# Patient Record
Sex: FEMALE | Race: WHITE | NOT HISPANIC OR LATINO | Employment: FULL TIME | URBAN - METROPOLITAN AREA
[De-identification: names, ages, dates, MRNs, and addresses within clinical notes are randomized per-mention and may not be internally consistent; named-entity substitution may affect disease eponyms.]

---

## 2021-10-13 ENCOUNTER — OFFICE VISIT (OUTPATIENT)
Dept: INTERNAL MEDICINE | Facility: CLINIC | Age: 56
End: 2021-10-13

## 2021-10-13 VITALS
WEIGHT: 228 LBS | SYSTOLIC BLOOD PRESSURE: 156 MMHG | TEMPERATURE: 97.8 F | BODY MASS INDEX: 36.64 KG/M2 | HEART RATE: 76 BPM | HEIGHT: 66 IN | DIASTOLIC BLOOD PRESSURE: 92 MMHG

## 2021-10-13 DIAGNOSIS — I10 BENIGN ESSENTIAL HYPERTENSION: Primary | ICD-10-CM

## 2021-10-13 DIAGNOSIS — E11.9 INSULIN DEPENDENT TYPE 2 DIABETES MELLITUS (HCC): ICD-10-CM

## 2021-10-13 DIAGNOSIS — H26.9 CATARACT OF BOTH EYES, UNSPECIFIED CATARACT TYPE: ICD-10-CM

## 2021-10-13 DIAGNOSIS — Z01.818 PRE-OP EVALUATION: ICD-10-CM

## 2021-10-13 DIAGNOSIS — M60.9 MYOSITIS, UNSPECIFIED MYOSITIS TYPE, UNSPECIFIED SITE: ICD-10-CM

## 2021-10-13 DIAGNOSIS — G89.29 CHRONIC PAIN OF BOTH SHOULDERS: ICD-10-CM

## 2021-10-13 DIAGNOSIS — F51.01 PRIMARY INSOMNIA: ICD-10-CM

## 2021-10-13 DIAGNOSIS — Z79.899 LONG-TERM USE OF HIGH-RISK MEDICATION: ICD-10-CM

## 2021-10-13 DIAGNOSIS — Z79.4 INSULIN DEPENDENT TYPE 2 DIABETES MELLITUS (HCC): ICD-10-CM

## 2021-10-13 DIAGNOSIS — F41.9 ANXIETY AND DEPRESSION: ICD-10-CM

## 2021-10-13 DIAGNOSIS — F32.A ANXIETY AND DEPRESSION: ICD-10-CM

## 2021-10-13 DIAGNOSIS — M25.512 CHRONIC PAIN OF BOTH SHOULDERS: ICD-10-CM

## 2021-10-13 DIAGNOSIS — Z85.820 HISTORY OF MELANOMA: ICD-10-CM

## 2021-10-13 DIAGNOSIS — M25.511 CHRONIC PAIN OF BOTH SHOULDERS: ICD-10-CM

## 2021-10-13 PROCEDURE — 99205 OFFICE O/P NEW HI 60 MIN: CPT | Performed by: NURSE PRACTITIONER

## 2021-10-13 RX ORDER — TOPIRAMATE 50 MG/1
50 TABLET, FILM COATED ORAL 2 TIMES DAILY
COMMUNITY

## 2021-10-13 RX ORDER — MULTIPLE VITAMINS W/ MINERALS TAB 9MG-400MCG
1 TAB ORAL DAILY
COMMUNITY
End: 2022-02-22

## 2021-10-13 RX ORDER — LISINOPRIL 40 MG/1
40 TABLET ORAL DAILY
COMMUNITY

## 2021-10-13 RX ORDER — OMEPRAZOLE 40 MG/1
40 CAPSULE, DELAYED RELEASE ORAL DAILY PRN
COMMUNITY

## 2021-10-13 RX ORDER — LORATADINE 10 MG/1
10 TABLET ORAL DAILY
COMMUNITY

## 2021-10-13 RX ORDER — ZOLPIDEM TARTRATE 10 MG/1
10 TABLET ORAL NIGHTLY PRN
COMMUNITY
End: 2021-10-20 | Stop reason: SDUPTHER

## 2021-10-13 RX ORDER — FUROSEMIDE 40 MG/1
40 TABLET ORAL DAILY
COMMUNITY

## 2021-10-13 RX ORDER — AMLODIPINE BESYLATE 5 MG/1
5 TABLET ORAL DAILY
Qty: 30 TABLET | Refills: 2 | Status: SHIPPED | OUTPATIENT
Start: 2021-10-13 | End: 2022-01-04

## 2021-10-13 RX ORDER — ESCITALOPRAM OXALATE 10 MG/1
10 TABLET ORAL DAILY
COMMUNITY
End: 2021-10-13 | Stop reason: SDUPTHER

## 2021-10-13 RX ORDER — ESCITALOPRAM OXALATE 10 MG/1
20 TABLET ORAL DAILY
Qty: 30 TABLET | Refills: 2 | Status: SHIPPED | OUTPATIENT
Start: 2021-10-13 | End: 2021-10-25 | Stop reason: SDUPTHER

## 2021-10-13 RX ORDER — ALPRAZOLAM 0.5 MG/1
0.5 TABLET ORAL 2 TIMES DAILY PRN
COMMUNITY
End: 2021-10-20 | Stop reason: SDUPTHER

## 2021-10-13 RX ORDER — TIZANIDINE 4 MG/1
4 TABLET ORAL EVERY 8 HOURS PRN
COMMUNITY

## 2021-10-13 RX ORDER — HYDROCODONE BITARTRATE AND ACETAMINOPHEN 7.5; 325 MG/1; MG/1
1 TABLET ORAL EVERY 8 HOURS PRN
COMMUNITY
End: 2021-10-20 | Stop reason: SDUPTHER

## 2021-10-13 NOTE — PROGRESS NOTES
Shanique Luke  1965  [id number]  Patient Care Team:  Tonya Taylor APRN as PCP - General (Internal Medicine)    Shanique Luke is a pleasant 56 y.o. female who presents for evaluation of Pre-op Exam (Patient is here to establish care )    Chief Complaint   Patient presents with   • Pre-op Exam     Patient is here to establish care        HPI:   Riana is scheduled for cataract surgery.  She needs pre-op evaluation. Left eye cataract surgery scheduled 10/27/21 Dr. Garcia  No cardiac history no lung history.  No STUART.  Multiple past surgeries, no past problem with anesthesia.  Quit smoking 12 yrs ago.    Most recent labs Aug 2021 with PCP [facility]    Riana is here to establish care.  Her primary care provider at [facility] was Kellie Tierney.  She had been caring for her for years but an insurance change has necessitated a provider change.  She has a significant medical history including debilitating inclusion body myositis (formal dx ), hypertension, diabetes (insulin pump), melanoma, multinodular thyroid, primary insomnia, depression and anxiety.  She has had a traumatic adult life.  Her first  committed suicide.  They were together from the time she was [age].  Her second  left her because he thought she was lazy.  After their divorce when she was finally diagnosed with myositis by [facility] he turned to drugs and  of a heroin overdose.  She has no children.  Her parents live in [place], her mom has dementia.  She has a sister that lives here.  States he worked as a  for 20 years, then as an LPN.  She quit working in April of this year due to worse disability and frequent falls.  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    Driving is difficult most days, she makes short trips to grocery, but getting walker in and out of the car is hard. Does have a motorized scooter, [facility] [place] [facility]. Chronic right sided foot drop.    Bilat frozen shoulders, left  worse than right, pain worse after most recent fall and she could no longer work.  Uses hydrocodone BID, uses Advil 200mg every am and prn during the night    bp is high here and has seen it high at home recently.  Compliant with lisinopril and furosemide    Inclusion body myositis formal dx 2014 at [facility] after 10 years of leg pain, loss of muscle in extremities and frequent falls.  Does PT at home to maintain muscle.     Anxiety and Depression:  On Topamax, alprazolam BID, and escitalopram.  Ambien for insomnia.    Past Medical History:   Diagnosis Date   • Anxiety    • Arthritis    • Depression    • Diabetes mellitus (HCC)    • Inclusion body myositis    • Insomnia    • Melanoma (HCC)    • Multinodular thyroid    • Small bowel obstruction (HCC)      Past Surgical History:   Procedure Laterality Date   • CATARACT EXTRACTION Right 2019   • CERVICAL FUSION     • GASTROPLASTY     • HYSTERECTOMY     • LAMINECTOMY  2016   • MYOMECTOMY     • PELVIC LAPAROSCOPY       Family History   Problem Relation Age of Onset   • Depression Mother    • Diabetes Mother    • Depression Father    • Hypertension Father    • Rheum arthritis Maternal Grandmother    • Osteoporosis Maternal Grandmother    • Stroke Maternal Grandmother    • Arthritis Maternal Grandmother         RA   • Stroke Maternal Grandfather    • Pancreatic cancer Paternal Grandfather    • Cancer Paternal Grandfather         pancreatic     Social History     Tobacco Use   Smoking Status Former Smoker   • Packs/day: 1.50   • Years: 10.00   • Pack years: 15.00   • Quit date: 10/13/2009   • Years since quittin.0   Smokeless Tobacco Never Used     Allergies   Allergen Reactions   • Morphine Shortness Of Breath     Respiratory distress     • Penicillins Rash       Current Outpatient Medications:   •  Ascorbic Acid (VITAMIN C PO), Take 1 tablet by mouth Daily., Disp: , Rfl:   •  Cholecalciferol (VITAMIN D3 PO), Take 1 capsule by mouth  "Daily., Disp: , Rfl:   •  furosemide (LASIX) 40 MG tablet, Take 40 mg by mouth Daily., Disp: , Rfl:   •  insulin lispro (HumaLOG) 100 UNIT/ML patient supplied pump, Inject 100 Units under the skin into the appropriate area as directed Continuous. Uses 3 vials per month, Disp: , Rfl:   •  lisinopril (PRINIVIL,ZESTRIL) 40 MG tablet, Take 40 mg by mouth Daily., Disp: , Rfl:   •  loratadine (CLARITIN) 10 MG tablet, Take 10 mg by mouth Daily., Disp: , Rfl:   •  Multiple Vitamins-Minerals (ZINC PO), Take 1 tablet by mouth Daily., Disp: , Rfl:   •  multivitamin with minerals (HAIR/SKIN/NAILS PO), Take 1 tablet by mouth Daily., Disp: , Rfl:   •  omeprazole (priLOSEC) 40 MG capsule, Take 40 mg by mouth Daily., Disp: , Rfl:   •  tiZANidine (ZANAFLEX) 4 MG tablet, Take 4 mg by mouth Every 8 (Eight) Hours As Needed for Muscle Spasms., Disp: , Rfl:   •  topiramate (TOPAMAX) 50 MG tablet, Take 50 mg by mouth 2 (Two) Times a Day., Disp: , Rfl:   •  ALPRAZolam (XANAX) 0.5 MG tablet, Take 1 tablet by mouth 2 (Two) Times a Day As Needed for Anxiety., Disp: 60 tablet, Rfl: 2  •  amLODIPine (NORVASC) 5 MG tablet, Take 1 tablet by mouth Daily., Disp: 30 tablet, Rfl: 2  •  escitalopram (LEXAPRO) 10 MG tablet, Take 2 tablets by mouth Daily., Disp: 180 tablet, Rfl: 1  •  HYDROcodone-acetaminophen (NORCO) 7.5-325 MG per tablet, Take 1 tablet by mouth Every 8 (Eight) Hours As Needed for Moderate Pain ., Disp: 90 tablet, Rfl: 0  •  zolpidem (AMBIEN) 10 MG tablet, Take 1 tablet by mouth At Night As Needed for Sleep., Disp: 30 tablet, Rfl: 2    Review of Systems  /92 (BP Location: Left arm, Patient Position: Sitting, Cuff Size: Adult)   Pulse 76   Temp 97.8 °F (36.6 °C) (Temporal)   Ht 167 cm (65.75\")   Wt 103 kg (228 lb)   BMI 37.08 kg/m²     Physical Exam  Constitutional:       Appearance: She is well-developed.   HENT:      Head: Normocephalic and atraumatic.      Comments: *wearing mask  Eyes:      Conjunctiva/sclera: " Conjunctivae normal.      Pupils: Pupils are equal, round, and reactive to light.   Cardiovascular:      Rate and Rhythm: Normal rate and regular rhythm.      Pulses: Normal pulses.      Heart sounds: Normal heart sounds.   Pulmonary:      Effort: Pulmonary effort is normal.      Breath sounds: Normal breath sounds.   Musculoskeletal:         General: Normal range of motion.      Cervical back: Normal range of motion and neck supple.      Right lower leg: No edema.      Left lower leg: No edema.   Skin:     General: Skin is warm and dry.   Neurological:      Mental Status: She is alert and oriented to person, place, and time.   Psychiatric:         Mood and Affect: Mood normal.         Behavior: Behavior normal.         Thought Content: Thought content normal.         Judgment: Judgment normal.         Procedures    Results Review:  None    PHQ-9 Total Score:      Assessment/Plan:  Diagnoses and all orders for this visit:    1. Benign essential hypertension (Primary)  Comments:  continue lisinopril and furosemide, adding amlodipne 5mg    2. Pre-op evaluation  Comments:  cleared for cataract surgery    3. Myositis, unspecified myositis type, unspecified site  Comments:  continue home strength training, activity as tolerated, will refer to Dr. Iverson, will refill hydrocodone this mo  Orders:  -     Ambulatory Referral to Pain Management    4. Anxiety and depression  Comments:  will refer to behavioral health, will refill alprazolam this mo, do not recommend for long term use  Orders:  -     Ambulatory Referral to Behavioral Health    5. Primary insomnia  -     Ambulatory Referral to Behavioral Health    6. Insulin dependent type 2 diabetes mellitus (HCC)  Comments:  insulin pump    7. History of melanoma    8. Long-term use of high-risk medication  -     Cancel: Compliance Drug Analysis, Ur - Urine, Clean Catch; Future    9. Cataract of both eyes, unspecified cataract type    10. Chronic pain of both shoulders  -      Ambulatory Referral to Pain Management    Other orders  -     amLODIPine (NORVASC) 5 MG tablet; Take 1 tablet by mouth Daily.  Dispense: 30 tablet; Refill: 2  -     Discontinue: escitalopram (LEXAPRO) 10 MG tablet; Take 2 tablets by mouth Daily.  Dispense: 30 tablet; Refill: 2       Patient Instructions   Will request records from Kellie Tierney at [facility] including most recent lab results.  Clearance for cataract surgery pending.    Plan of care reviewed with patient at the conclusion of today's visit. Education was provided regarding diagnosis, management and any prescribed or recommended OTC medications.  Patient verbalizes understanding of and agreement with management plan.    Return in about 1 month (around 11/13/2021).    *Note that portions of this note were completed with a voice recognition program.  Efforts were made to edit the dictation but occasionally words are transcribed.    TREVOR Prince    I spent 60 minutes in patient care: Reviewing records prior to the visit, examining the patient, entering orders and documentation.

## 2021-10-16 PROBLEM — Z85.820 HISTORY OF MELANOMA: Status: ACTIVE | Noted: 2021-10-16

## 2021-10-16 PROBLEM — F32.A ANXIETY AND DEPRESSION: Status: ACTIVE | Noted: 2021-10-16

## 2021-10-16 PROBLEM — F51.01 PRIMARY INSOMNIA: Status: ACTIVE | Noted: 2021-10-16

## 2021-10-16 PROBLEM — I10 BENIGN ESSENTIAL HYPERTENSION: Status: ACTIVE | Noted: 2021-10-16

## 2021-10-16 PROBLEM — M60.9 MYOSITIS: Status: ACTIVE | Noted: 2021-10-16

## 2021-10-16 PROBLEM — F41.9 ANXIETY AND DEPRESSION: Status: ACTIVE | Noted: 2021-10-16

## 2021-10-16 PROBLEM — G72.41 INCLUSION BODY MYOSITIS (IBM): Status: ACTIVE | Noted: 2021-10-16

## 2021-10-16 NOTE — PATIENT INSTRUCTIONS
Will request records from Kellie Tierney at [facility] including most recent lab results.  Clearance for cataract surgery pending.

## 2021-10-19 LAB — DRUGS UR: NORMAL

## 2021-10-20 ENCOUNTER — PATIENT ROUNDING (BHMG ONLY) (OUTPATIENT)
Dept: INTERNAL MEDICINE | Facility: CLINIC | Age: 56
End: 2021-10-20

## 2021-10-20 DIAGNOSIS — F32.A ANXIETY AND DEPRESSION: Primary | ICD-10-CM

## 2021-10-20 DIAGNOSIS — G72.41 INCLUSION BODY MYOSITIS: ICD-10-CM

## 2021-10-20 DIAGNOSIS — F41.9 ANXIETY AND DEPRESSION: Primary | ICD-10-CM

## 2021-10-20 DIAGNOSIS — F51.01 PRIMARY INSOMNIA: ICD-10-CM

## 2021-10-20 RX ORDER — HYDROCODONE BITARTRATE AND ACETAMINOPHEN 7.5; 325 MG/1; MG/1
1 TABLET ORAL EVERY 8 HOURS PRN
Qty: 90 TABLET | Refills: 0 | Status: SHIPPED | OUTPATIENT
Start: 2021-10-20 | End: 2021-12-01 | Stop reason: SDUPTHER

## 2021-10-20 RX ORDER — ZOLPIDEM TARTRATE 10 MG/1
10 TABLET ORAL NIGHTLY PRN
Qty: 30 TABLET | Refills: 2 | Status: SHIPPED | OUTPATIENT
Start: 2021-10-20 | End: 2022-02-22 | Stop reason: SDUPTHER

## 2021-10-20 RX ORDER — ALPRAZOLAM 0.5 MG/1
0.5 TABLET ORAL 2 TIMES DAILY PRN
Qty: 60 TABLET | Refills: 2 | Status: SHIPPED | OUTPATIENT
Start: 2021-10-20

## 2021-10-20 NOTE — PROGRESS NOTES
Spoke to this patient for over 20 mins,  She stated that Tonya was so nice & well intentioned, however she (Tonya) doesn't understand her neuromuscular disease & that her meds have been worked out with her providers over the years.  Was diagnosed at [facility] 7 yrs ago & the disease is rare.  Hydrocodine is least amount of med she can take without damage to her kidneys & in light of her diabetes.  States her Pascual is clean & she only takes when she has pain.  Has 15 to 20 pills now but will need a refill before her next appt here.  She told Tonya she needed a refill at her visit, however it was not done. Pt is now feeling that we think she is an addict when she is not & she is afraid to ask for the refill again.    Also concerned over her xanax as Tonya would like her to go to counseling & go off xanax.  States the xanax works for her as she has a lot of violent interactions in her past job & personal life as well.  Her panic attacks are now under control & it makes her nervous to think of coming off the med.    Her hope is that [facility] will again take her ins the first of the year so she can return to her regular physician.    I asked her if I could send Tonya a message to refill the Hydrocodone for her as it may have just been an oversight.  She agreed to this.    Told her to take care & we would see her back Nov 16th.

## 2021-10-20 NOTE — TELEPHONE ENCOUNTER
Called pt to welcome her as a new patient to our practice & ask if her visit went well.    Long discussion about her disease/condition as well as that she asked for a refill of her hydrocodone at that visit but it was not refilled nor was she informed of why.    I stated that it is possible this was an oversight on our part & that I would put in a request for her.    If not refillable, please inform the pt of the reason so she can be educated on this.    Thanks

## 2021-10-20 NOTE — TELEPHONE ENCOUNTER
You can let her know I refilled all 3 of her controlled medicines, I was able to check her Pascual I had just been waiting on her drug screen to result.

## 2021-10-21 ENCOUNTER — TELEPHONE (OUTPATIENT)
Dept: INTERNAL MEDICINE | Facility: CLINIC | Age: 56
End: 2021-10-21

## 2021-10-21 RX ORDER — ESCITALOPRAM OXALATE 10 MG/1
20 TABLET ORAL DAILY
Qty: 180 TABLET | Refills: 1 | Status: CANCELLED | OUTPATIENT
Start: 2021-10-21

## 2021-10-21 NOTE — TELEPHONE ENCOUNTER
Caller: [id number] [employer] - [place], [place] - [address] - [phone] [facility] [phone] FX    Relationship: Pharmacy    Best call back number: [phone]    What test/procedure requested: NORCO 7.5    When is it needed: [id number]    Additional information or concerns:  THIS RX NEEDS A PRIOR AUTH

## 2021-10-21 NOTE — TELEPHONE ENCOUNTER
Called talked to Kaveh informed him we do not do PA for anything under $40.00 and they can get it with a [email or web address] card for $$21.44  He said he will let patient know

## 2021-10-21 NOTE — TELEPHONE ENCOUNTER
Spoke with pt and she said she never requested a 90 day supply of a scheduled narcotic. She states that she has never heard of such a thing as an office not doing a PA when a PA would help her get the medicine for free. She states that she is completely disappointed in her experience with out office. 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

## 2021-10-25 RX ORDER — ESCITALOPRAM OXALATE 10 MG/1
20 TABLET ORAL DAILY
Qty: 180 TABLET | Refills: 1 | Status: SHIPPED | OUTPATIENT
Start: 2021-10-25 | End: 2022-02-22 | Stop reason: SDUPTHER

## 2021-10-26 ENCOUNTER — TELEPHONE (OUTPATIENT)
Dept: INTERNAL MEDICINE | Facility: CLINIC | Age: 56
End: 2021-10-26

## 2021-10-26 ENCOUNTER — DOCUMENTATION (OUTPATIENT)
Dept: INTERNAL MEDICINE | Facility: CLINIC | Age: 56
End: 2021-10-26

## 2021-10-26 NOTE — TELEPHONE ENCOUNTER
Unfortunately, I cannot clear her for surgery without reviewing her recent labs from her prior PCP.  She has a significant amount of medical history including diabetes.  We have requested those labs from [facility] but I never got them.  If we can get them today I may be able to clear her

## 2021-10-26 NOTE — TELEPHONE ENCOUNTER
ALAN FROM DR. ANGELICA BENITEZ'S OFFICE IS CALLING TO SEE IF PATIENT HAS BEEN CLEARED FOR CATARACT SURGERY SCHEDULED FOR IN THE MORNING AT 8AM.    THE OFFICE NOTE FROM 10/13/21 SAYS CLEARANCE DEPENDENT ON LAB RESULTS FOR DRUG ANALYSIS.    NEED TO KNOW ANSWERS ASAP BECAUSE PATIENT IS SECOND IN LINE IN THE MORNING FOR CATARACT SURGERY.    [phone]  OFFICE [phone]    ADDENDUM:  [facility] WAS WAITING ON LABS THAT PATIENT HAD DONE BY ANOTHER PROVIDER IN ORDER TO CLEAR HER FOR SURGERY.  LABS HAD NOT BEEN RECEIVED AS OF YET.

## 2021-10-26 NOTE — TELEPHONE ENCOUNTER
Called [facility] and they are faxing over notes. Called Dr. Schmidt's office to give an update and Dr. Schmidt asked to speak with Tonya and the phone call was transferred to Tonya.

## 2021-10-26 NOTE — PROGRESS NOTES
Spoke with Dr Javier this afternoon, he was able to discuss most recent CBC, CMP and A1C from August.  A1C 9.2, BUN 24.  Will clear for surgery and fax letter back to his office for cataract surgery tomorrow.

## 2021-11-16 ENCOUNTER — OFFICE VISIT (OUTPATIENT)
Dept: INTERNAL MEDICINE | Facility: CLINIC | Age: 56
End: 2021-11-16

## 2021-11-16 ENCOUNTER — LAB (OUTPATIENT)
Dept: LAB | Facility: HOSPITAL | Age: 56
End: 2021-11-16

## 2021-11-16 VITALS
DIASTOLIC BLOOD PRESSURE: 84 MMHG | SYSTOLIC BLOOD PRESSURE: 146 MMHG | TEMPERATURE: 97.1 F | HEIGHT: 66 IN | HEART RATE: 84 BPM | WEIGHT: 228 LBS | BODY MASS INDEX: 36.64 KG/M2

## 2021-11-16 DIAGNOSIS — E78.2 MIXED HYPERLIPIDEMIA: ICD-10-CM

## 2021-11-16 DIAGNOSIS — M25.50 ARTHRALGIA, UNSPECIFIED JOINT: ICD-10-CM

## 2021-11-16 DIAGNOSIS — F41.9 ANXIETY AND DEPRESSION: ICD-10-CM

## 2021-11-16 DIAGNOSIS — E11.9 INSULIN DEPENDENT TYPE 2 DIABETES MELLITUS (HCC): Primary | ICD-10-CM

## 2021-11-16 DIAGNOSIS — E11.9 INSULIN DEPENDENT TYPE 2 DIABETES MELLITUS (HCC): ICD-10-CM

## 2021-11-16 DIAGNOSIS — D64.9 ANEMIA, UNSPECIFIED TYPE: ICD-10-CM

## 2021-11-16 DIAGNOSIS — E55.9 VITAMIN D DEFICIENCY: ICD-10-CM

## 2021-11-16 DIAGNOSIS — R59.9 SWELLING OF LYMPH NODE: ICD-10-CM

## 2021-11-16 DIAGNOSIS — Z79.4 INSULIN DEPENDENT TYPE 2 DIABETES MELLITUS (HCC): Primary | ICD-10-CM

## 2021-11-16 DIAGNOSIS — G72.41 INCLUSION BODY MYOSITIS (IBM): ICD-10-CM

## 2021-11-16 DIAGNOSIS — Z79.4 INSULIN DEPENDENT TYPE 2 DIABETES MELLITUS (HCC): ICD-10-CM

## 2021-11-16 DIAGNOSIS — F32.A ANXIETY AND DEPRESSION: ICD-10-CM

## 2021-11-16 DIAGNOSIS — I10 BENIGN ESSENTIAL HYPERTENSION: ICD-10-CM

## 2021-11-16 DIAGNOSIS — L40.9 SCALP PSORIASIS: ICD-10-CM

## 2021-11-16 PROBLEM — Z98.84 H/O GASTRIC BYPASS: Status: ACTIVE | Noted: 2021-11-16

## 2021-11-16 LAB
EXPIRATION DATE: ABNORMAL
HBA1C MFR BLD: 7.9 % (ref 4.8–5.6)
Lab: ABNORMAL

## 2021-11-16 PROCEDURE — 83036 HEMOGLOBIN GLYCOSYLATED A1C: CPT | Performed by: NURSE PRACTITIONER

## 2021-11-16 PROCEDURE — 99215 OFFICE O/P EST HI 40 MIN: CPT | Performed by: NURSE PRACTITIONER

## 2021-11-16 PROCEDURE — 90471 IMMUNIZATION ADMIN: CPT | Performed by: NURSE PRACTITIONER

## 2021-11-16 PROCEDURE — 90686 IIV4 VACC NO PRSV 0.5 ML IM: CPT | Performed by: NURSE PRACTITIONER

## 2021-11-16 PROCEDURE — 3051F HG A1C>EQUAL 7.0%<8.0%: CPT | Performed by: NURSE PRACTITIONER

## 2021-11-16 RX ORDER — PREDNISOLONE ACETATE 10 MG/ML
1 SUSPENSION/ DROPS OPHTHALMIC 2 TIMES DAILY
COMMUNITY
Start: 2021-10-12 | End: 2022-02-22

## 2021-11-16 RX ORDER — KETOROLAC TROMETHAMINE 5 MG/ML
1 SOLUTION OPHTHALMIC 2 TIMES DAILY
COMMUNITY
Start: 2021-10-12 | End: 2022-02-22

## 2021-11-16 NOTE — PROGRESS NOTES
Shanique Luke  1965  [id number]  Patient Care Team:  Tonya Taylor APRN as PCP - General (Internal Medicine)  Henrry Simms MD (Rheumatology)  Harshad Stephens (Endocrinology)  Lester Garcia MD as Consulting Physician (Ophthalmology)  Dell Ritter MD (Gastroenterology)    Shanique Luke is a pleasant 56 y.o. female who presents for evaluation of Hypertension (1 month follow up )    Chief Complaint   Patient presents with   • Hypertension     1 month follow up         HPI:   Riana is here for follow up.  This is our second visit.  Her primary care provider at [facility] was Dr. Kellie Tierney. She had been caring for her for years but an insurance change has necessitated a provider change.  She has a significant medical history including debilitating inclusion body myositis (formal dx 2014), hypertension, diabetes (insulin pump), melanoma, multinodular thyroid, primary insomnia, depression and anxiety.     I was able to review last labs from Aug which included CBC, CMP, A1C.  CBC showed mild anemia.  She is fasting for labs today.    DM:  Dx at age 18.  Has had an insulin pump about 8 yrs.   avg fasting am bs 119, sometimes as low as 80.  Rarely lower than 80. Rarely above 250.  Last A1C 9.2, today 7.9.  Highest was 13 which was about a year ago.  She verbalized that she is not motivated to care much about it. Sees Minnie for endo, he has treated thyroid primarily in the past.  Sees Dr. Javier for eye exams.  Recent cataract surgery went well.  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  She knows what to do but struggles with the diabetic diet. She is not on a statin and has declined in the past.     Unsure if she has seen cards in the past    HTN:  Better after adding amlodipine 5 mg last visit.  No chest pain, Left Ankle swelling is chronic (secondary to remote injury), otherwise no swelling.  Home bp avg 120s/70s    She had vertical banded gastroplasty about 5 yrs ago.   Before the myositis she weighed about 180 and was active daily.    She has noticed swollen cervical lymph nodes intermittantly over the past year. sometimes supraclaicular and posterior neck.  Had thyroid u/s and biopsy last year    She has had scalp lesions for many years that have never been treated.  They develop as pustules and itch then often change into plaques or shiny lesions with loss of hair. Generalized hair thinning.    No past history of autoimmune d/o.  Had neg AUDELIA years ago.  Past tx with methotrexate, prednisone and IgG infusions during work up for myositis.  Originally thought she had dermatomyositis because of skin lesion on right 3rd finger but muscle biopsy ruled that out and inclusion body myositis was dx by [facility]. She has treated with topical steroid in the past.  Sx of muscle weakness and pain developed drastically from May to Oct 2006.    Past Medical History:   Diagnosis Date   • Anxiety    • Arthritis    • Depression    • Diabetes mellitus (HCC)    • Inclusion body myositis    • Insomnia    • Melanoma (HCC) 1995   • Multinodular thyroid    • Small bowel obstruction (HCC)      Past Surgical History:   Procedure Laterality Date   • CATARACT EXTRACTION Right 2019   • CERVICAL FUSION  2018   • GASTROPLASTY  2001   • HYSTERECTOMY  2013   • LAMINECTOMY  2016   • MYOMECTOMY  1994   • PELVIC LAPAROSCOPY  1995     Family History   Problem Relation Age of Onset   • Depression Mother    • Diabetes Mother    • Depression Father    • Hypertension Father    • Rheum arthritis Maternal Grandmother    • Osteoporosis Maternal Grandmother    • Stroke Maternal Grandmother    • Arthritis Maternal Grandmother         RA   • Stroke Maternal Grandfather    • Pancreatic cancer Paternal Grandfather    • Cancer Paternal Grandfather         pancreatic     Social History     Tobacco Use   Smoking Status Former Smoker   • Packs/day: 1.50   • Years: 10.00   • Pack years: 15.00   • Quit date: 10/13/2009   • Years  since quittin.1   Smokeless Tobacco Never Used     Allergies   Allergen Reactions   • Morphine Shortness Of Breath     Respiratory distress     • Doxycycline Hyclate Rash   • Penicillins Rash       Current Outpatient Medications:   •  ALPRAZolam (XANAX) 0.5 MG tablet, Take 1 tablet by mouth 2 (Two) Times a Day As Needed for Anxiety., Disp: 60 tablet, Rfl: 2  •  amLODIPine (NORVASC) 5 MG tablet, Take 1 tablet by mouth Daily., Disp: 30 tablet, Rfl: 2  •  Ascorbic Acid (VITAMIN C PO), Take 1 tablet by mouth Daily., Disp: , Rfl:   •  Cholecalciferol (VITAMIN D3 PO), Take 1 capsule by mouth Daily., Disp: , Rfl:   •  escitalopram (LEXAPRO) 10 MG tablet, Take 2 tablets by mouth Daily., Disp: 180 tablet, Rfl: 1  •  furosemide (LASIX) 40 MG tablet, Take 40 mg by mouth Daily., Disp: , Rfl:   •  HYDROcodone-acetaminophen (NORCO) 7.5-325 MG per tablet, Take 1 tablet by mouth Every 8 (Eight) Hours As Needed for Moderate Pain ., Disp: 90 tablet, Rfl: 0  •  insulin lispro (HumaLOG) 100 UNIT/ML patient supplied pump, Inject 100 Units under the skin into the appropriate area as directed Continuous. Uses 3 vials per month, Disp: , Rfl:   •  ketorolac (ACULAR) 0.5 % ophthalmic solution, Administer 1 drop into the left eye 2 (Two) Times a Day., Disp: , Rfl:   •  lisinopril (PRINIVIL,ZESTRIL) 40 MG tablet, Take 40 mg by mouth Daily., Disp: , Rfl:   •  loratadine (CLARITIN) 10 MG tablet, Take 10 mg by mouth Daily., Disp: , Rfl:   •  Multiple Vitamins-Minerals (ZINC PO), Take 1 tablet by mouth Daily., Disp: , Rfl:   •  multivitamin with minerals (HAIR/SKIN/NAILS PO), Take 1 tablet by mouth Daily., Disp: , Rfl:   •  omeprazole (priLOSEC) 40 MG capsule, Take 40 mg by mouth Daily., Disp: , Rfl:   •  prednisoLONE acetate (PRED FORTE) 1 % ophthalmic suspension, Administer 1 drop into the left eye 2 (Two) Times a Day., Disp: , Rfl:   •  tiZANidine (ZANAFLEX) 4 MG tablet, Take 4 mg by mouth Every 8 (Eight) Hours As Needed for Muscle  "Spasms., Disp: , Rfl:   •  topiramate (TOPAMAX) 50 MG tablet, Take 50 mg by mouth 2 (Two) Times a Day., Disp: , Rfl:   •  zolpidem (AMBIEN) 10 MG tablet, Take 1 tablet by mouth At Night As Needed for Sleep., Disp: 30 tablet, Rfl: 2  •  ketoconazole (NIZORAL) 2 % shampoo, Apply  topically to the appropriate area as directed 2 (Two) Times a Week., Disp: 120 mL, Rfl: 2  •  vitamin B-12 (CYANOCOBALAMIN) 1000 MCG tablet, Take 1 tablet by mouth Daily., Disp: 90 tablet, Rfl: 3    Review of Systems  /84 (BP Location: Left arm, Patient Position: Sitting, Cuff Size: Adult)   Pulse 84   Temp 97.1 °F (36.2 °C) (Temporal)   Ht 167 cm (65.75\")   Wt 103 kg (228 lb)   BMI 37.08 kg/m²     Physical Exam  Constitutional:       Appearance: She is well-developed.   HENT:      Head: Normocephalic and atraumatic.      Comments: *wearing mask  Eyes:      Conjunctiva/sclera: Conjunctivae normal.      Pupils: Pupils are equal, round, and reactive to light.   Cardiovascular:      Rate and Rhythm: Normal rate and regular rhythm.      Pulses: Normal pulses.      Heart sounds: Normal heart sounds.   Pulmonary:      Effort: Pulmonary effort is normal.      Breath sounds: Normal breath sounds.   Musculoskeletal:         General: Normal range of motion.      Cervical back: Normal range of motion and neck supple.      Left lower le+ Edema present.   Skin:     General: Skin is warm and dry.      Comments: Scalp lesions scattered all over, some pustules, some plaques, some appear shiny like scar without hair    ezcema vs psoriasis right 3rd finger   Neurological:      Mental Status: She is alert and oriented to person, place, and time.   Psychiatric:         Attention and Perception: Attention normal.         Mood and Affect: Mood normal.         Speech: Speech normal.         Behavior: Behavior normal.         Thought Content: Thought content normal.         Cognition and Memory: Cognition normal.         Judgment: Judgment normal. "         Procedures    Results Review:  I reviewed the patient's new clinical results.    PHQ-9 Total Score: 12    Assessment/Plan:  Diagnoses and all orders for this visit:    1. Insulin dependent type 2 diabetes mellitus (HCC) (Primary)  Comments:  have Dr. Stephens follow diabetes, has insulin pump, A1C sig improved  Orders:  -     POC Glycosylated Hemoglobin (Hb A1C)  -     CBC & Differential; Future  -     Comprehensive Metabolic Panel; Future  -     Lipid Panel; Future  -     TSH Rfx On Abnormal To Free T4; Future  -     Ambulatory Referral to Cardiology    2. Benign essential hypertension  Comments:  controlled on current meds  Orders:  -     Microalbumin / Creatinine Urine Ratio - Urine, Clean Catch; Future  -     Ambulatory Referral to Cardiology    3. Scalp psoriasis  Comments:  I suspect psoriasis, AUDELIA with labs then trial of ketoconazole shampoo    4. Swelling of lymph node  Comments:  labs today, f/u with endo    5. Anemia, unspecified type  Comments:  check cBC and ferritin today  Orders:  -     Vitamin B12; Future  -     Ferritin; Future    6. Arthralgia, unspecified joint  -     Nuclear Antigen Antibody, IFA; Future    7. Vitamin D deficiency  Comments:  check with labs  Orders:  -     Vitamin D 25 Hydroxy; Future    8. Mixed hyperlipidemia  Comments:  declined statin in past, fasting labs today  Orders:  -     Ambulatory Referral to Cardiology    9. Inclusion body myositis (IBM)  Comments:  have already sent referral to pain mgmt. continue opiods for now    10. Anxiety and depression  Comments:  have already sent referral to behavioral health, continue current meds for now    Other orders  -     FluLaval/Fluarix/Fluzone >6 Months       There are no Patient Instructions on file for this visit.  Plan of care reviewed with patient at the conclusion of today's visit. Education was provided regarding diagnosis, management and any prescribed or recommended OTC medications.  Patient verbalizes understanding of  and agreement with management plan.    Return in about 3 months (around 2/16/2022) for Labs this visit.    Dictated Utilizing Dragon Dictation.    Tonya Taylor, APRN    I spent 50 minutes in patient care: Reviewing records prior to the visit, examining the patient, entering orders and documentation.

## 2021-11-17 LAB
25(OH)D3+25(OH)D2 SERPL-MCNC: 27.5 NG/ML (ref 30–100)
ALBUMIN SERPL-MCNC: 4.3 G/DL (ref 3.8–4.9)
ALBUMIN/CREAT UR: 19 MG/G CREAT (ref 0–29)
ALBUMIN/GLOB SERPL: 1.5 {RATIO} (ref 1.2–2.2)
ALP SERPL-CCNC: 58 IU/L (ref 44–121)
ALT SERPL-CCNC: 23 IU/L (ref 0–32)
ANA TITR SER IF: NEGATIVE {TITER}
AST SERPL-CCNC: 22 IU/L (ref 0–40)
BASOPHILS # BLD AUTO: 0.1 X10E3/UL (ref 0–0.2)
BASOPHILS NFR BLD AUTO: 1 %
BILIRUB SERPL-MCNC: 0.6 MG/DL (ref 0–1.2)
BUN SERPL-MCNC: 18 MG/DL (ref 6–24)
BUN/CREAT SERPL: 24 (ref 9–23)
CALCIUM SERPL-MCNC: 9.5 MG/DL (ref 8.7–10.2)
CHLORIDE SERPL-SCNC: 107 MMOL/L (ref 96–106)
CHOLEST SERPL-MCNC: 231 MG/DL (ref 100–199)
CO2 SERPL-SCNC: 19 MMOL/L (ref 20–29)
CREAT SERPL-MCNC: 0.74 MG/DL (ref 0.57–1)
CREAT UR-MCNC: 33.4 MG/DL
EOSINOPHIL # BLD AUTO: 0.3 X10E3/UL (ref 0–0.4)
EOSINOPHIL NFR BLD AUTO: 4 %
ERYTHROCYTE [DISTWIDTH] IN BLOOD BY AUTOMATED COUNT: 13.5 % (ref 11.7–15.4)
FERRITIN SERPL-MCNC: 44 NG/ML (ref 15–150)
GLOBULIN SER CALC-MCNC: 2.9 G/DL (ref 1.5–4.5)
GLUCOSE SERPL-MCNC: 126 MG/DL (ref 65–99)
HCT VFR BLD AUTO: 37.3 % (ref 34–46.6)
HDLC SERPL-MCNC: 55 MG/DL
HGB BLD-MCNC: 12.1 G/DL (ref 11.1–15.9)
IMM GRANULOCYTES # BLD AUTO: 0 X10E3/UL (ref 0–0.1)
IMM GRANULOCYTES NFR BLD AUTO: 0 %
LDLC SERPL CALC-MCNC: 158 MG/DL (ref 0–99)
LYMPHOCYTES # BLD AUTO: 2 X10E3/UL (ref 0.7–3.1)
LYMPHOCYTES NFR BLD AUTO: 26 %
MCH RBC QN AUTO: 26.2 PG (ref 26.6–33)
MCHC RBC AUTO-ENTMCNC: 32.4 G/DL (ref 31.5–35.7)
MCV RBC AUTO: 81 FL (ref 79–97)
MICROALBUMIN UR-MCNC: 6.3 UG/ML
MONOCYTES # BLD AUTO: 0.5 X10E3/UL (ref 0.1–0.9)
MONOCYTES NFR BLD AUTO: 7 %
NEUTROPHILS # BLD AUTO: 4.8 X10E3/UL (ref 1.4–7)
NEUTROPHILS NFR BLD AUTO: 62 %
PLATELET # BLD AUTO: 316 X10E3/UL (ref 150–450)
POTASSIUM SERPL-SCNC: 4.2 MMOL/L (ref 3.5–5.2)
PROT SERPL-MCNC: 7.2 G/DL (ref 6–8.5)
RBC # BLD AUTO: 4.61 X10E6/UL (ref 3.77–5.28)
SODIUM SERPL-SCNC: 142 MMOL/L (ref 134–144)
TRIGL SERPL-MCNC: 104 MG/DL (ref 0–149)
TSH SERPL DL<=0.005 MIU/L-ACNC: 1.13 UIU/ML (ref 0.45–4.5)
VIT B12 SERPL-MCNC: 250 PG/ML (ref 232–1245)
VLDLC SERPL CALC-MCNC: 18 MG/DL (ref 5–40)
WBC # BLD AUTO: 7.7 X10E3/UL (ref 3.4–10.8)

## 2021-11-18 ENCOUNTER — TELEPHONE (OUTPATIENT)
Dept: INTERNAL MEDICINE | Facility: CLINIC | Age: 56
End: 2021-11-18

## 2021-11-18 PROBLEM — L40.9 SCALP PSORIASIS: Status: ACTIVE | Noted: 2021-11-18

## 2021-11-18 RX ORDER — LANOLIN ALCOHOL/MO/W.PET/CERES
1000 CREAM (GRAM) TOPICAL DAILY
Qty: 90 TABLET | Refills: 3 | Status: SHIPPED | OUTPATIENT
Start: 2021-11-18 | End: 2022-12-23

## 2021-11-18 RX ORDER — KETOCONAZOLE 20 MG/ML
SHAMPOO TOPICAL 2 TIMES WEEKLY
Qty: 120 ML | Refills: 2 | Status: SHIPPED | OUTPATIENT
Start: 2021-11-18

## 2021-11-18 NOTE — TELEPHONE ENCOUNTER
----- Message from TREVOR Prince sent at 11/18/2021  1:38 PM EST -----  Let her know I wanted to see her lab results first.  AUDELIA is negative.  I will send ketaconzole shampoo to use twice a week for 8 wks then as needed.  Also, she needs to add B12 daily and vit D +Ca daily.  I will send in B12 for her.  I would recommend a statin.  She has not wanted to do this previously she told me but with her diabetes and high LDL it is highly recommended.  If she is willing to try low dose for 3 mo let me know and I will send it in.

## 2021-11-18 NOTE — TELEPHONE ENCOUNTER
Caller: Shanique Luke    Relationship to patient: Self    Best call back number: [phone]     What is the call regarding:   PATIENT STATES THAT SHE WAS SUPPOSED TO GET A  MEDICATION SHAMPOO CALLED IN BY FORTINO TO [facility], [facility], BUT IT IS NOT THERE.

## 2021-11-19 RX ORDER — ROSUVASTATIN CALCIUM 5 MG/1
5 TABLET, COATED ORAL DAILY
Qty: 90 TABLET | Refills: 1 | Status: SHIPPED | OUTPATIENT
Start: 2021-11-19 | End: 2022-05-31

## 2021-11-19 NOTE — TELEPHONE ENCOUNTER
Patient returned and verbalized understanding. She is very concerned about dementia being associated with Statins especially with her mom having dementia but she would be willing to try.

## 2021-11-19 NOTE — TELEPHONE ENCOUNTER
I have sent in rosuvastatin 5 mg to her pharmacy.  I think the benefit of a statin in her situation outweighs the potential side effects.  There is no proven link between dementia and statin use.

## 2021-11-23 ENCOUNTER — TELEMEDICINE (OUTPATIENT)
Dept: PSYCHIATRY | Facility: CLINIC | Age: 56
End: 2021-11-23

## 2021-11-23 DIAGNOSIS — F33.1 MAJOR DEPRESSIVE DISORDER, RECURRENT EPISODE, MODERATE (HCC): ICD-10-CM

## 2021-11-23 DIAGNOSIS — Z86.59 HISTORY OF POSTTRAUMATIC STRESS DISORDER (PTSD): ICD-10-CM

## 2021-11-23 DIAGNOSIS — F41.1 GENERALIZED ANXIETY DISORDER: Primary | ICD-10-CM

## 2021-11-23 PROCEDURE — 90792 PSYCH DIAG EVAL W/MED SRVCS: CPT | Performed by: NURSE PRACTITIONER
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
## 2021-12-01 DIAGNOSIS — G72.41 INCLUSION BODY MYOSITIS: ICD-10-CM

## 2021-12-01 RX ORDER — HYDROCODONE BITARTRATE AND ACETAMINOPHEN 7.5; 325 MG/1; MG/1
1 TABLET ORAL EVERY 8 HOURS PRN
Qty: 90 TABLET | Refills: 0 | Status: SHIPPED | OUTPATIENT
Start: 2021-12-01 | End: 2022-02-22 | Stop reason: SDUPTHER

## 2021-12-01 NOTE — TELEPHONE ENCOUNTER
Rx Refill Note  Requested Prescriptions     Pending Prescriptions Disp Refills   • HYDROcodone-acetaminophen (NORCO) 7.5-325 MG per tablet 90 tablet 0     Sig: Take 1 tablet by mouth Every 8 (Eight) Hours As Needed for Moderate Pain .      Last office visit with prescribing clinician: 11/16/2021      Next office visit with prescribing clinician: 2/16/2022     LA: 10/20/21 #90 0R             Kellie Damon LPN  12/01/21, 13:13 EST

## 2021-12-03 ENCOUNTER — TELEPHONE (OUTPATIENT)
Dept: INTERNAL MEDICINE | Facility: CLINIC | Age: 56
End: 2021-12-03

## 2021-12-03 NOTE — TELEPHONE ENCOUNTER
Patient called upset because she is on medicaid insurance and she cant afford her pain medication. Its costing her $21 dollars. Back in October we received a PA but we did not do it since it was under $40. Patient can't afford to  med. Please advise. Do you want a PA?

## 2021-12-03 NOTE — TELEPHONE ENCOUNTER
Called pharmacy and spoke with Julito. He is going to fax over the PA. Called and notified patient we will attempt PA.

## 2021-12-06 ENCOUNTER — PRIOR AUTHORIZATION (OUTPATIENT)
Dept: INTERNAL MEDICINE | Facility: CLINIC | Age: 56
End: 2021-12-06

## 2021-12-06 NOTE — TELEPHONE ENCOUNTER
I am working on PA. When you get a chance tomorrow I would like for you to review at my desk to help answer a couple of questions when you get a chance.

## 2022-01-04 RX ORDER — AMLODIPINE BESYLATE 5 MG/1
TABLET ORAL
Qty: 90 TABLET | Refills: 1 | Status: SHIPPED | OUTPATIENT
Start: 2022-01-04

## 2022-01-04 NOTE — TELEPHONE ENCOUNTER
Rx Refill Note  Requested Prescriptions     Pending Prescriptions Disp Refills   • amLODIPine (NORVASC) 5 MG tablet [Pharmacy Med Name: AMLODIPINE BESYLATE 5 MG TAB] 90 tablet      Sig: TAKE 1 TABLET BY MOUTH EVERY DAY      Last office visit with prescribing clinician: 11/16/2021      Next office visit with prescribing clinician: 2/16/2022            Emeli Sadler LPN  01/04/22, 08:42 EST

## 2022-02-22 ENCOUNTER — OFFICE VISIT (OUTPATIENT)
Dept: INTERNAL MEDICINE | Facility: CLINIC | Age: 57
End: 2022-02-22

## 2022-02-22 VITALS
HEART RATE: 64 BPM | TEMPERATURE: 96.4 F | DIASTOLIC BLOOD PRESSURE: 72 MMHG | BODY MASS INDEX: 36.96 KG/M2 | SYSTOLIC BLOOD PRESSURE: 128 MMHG | WEIGHT: 230 LBS | HEIGHT: 66 IN

## 2022-02-22 DIAGNOSIS — Z12.31 ENCOUNTER FOR SCREENING MAMMOGRAM FOR MALIGNANT NEOPLASM OF BREAST: ICD-10-CM

## 2022-02-22 DIAGNOSIS — M25.512 CHRONIC PAIN OF BOTH SHOULDERS: ICD-10-CM

## 2022-02-22 DIAGNOSIS — E78.2 MIXED HYPERLIPIDEMIA: ICD-10-CM

## 2022-02-22 DIAGNOSIS — G89.29 CHRONIC PAIN OF BOTH SHOULDERS: ICD-10-CM

## 2022-02-22 DIAGNOSIS — G72.41 INCLUSION BODY MYOSITIS: ICD-10-CM

## 2022-02-22 DIAGNOSIS — G72.41 INCLUSION BODY MYOSITIS (IBM): ICD-10-CM

## 2022-02-22 DIAGNOSIS — E66.01 MORBID (SEVERE) OBESITY DUE TO EXCESS CALORIES: ICD-10-CM

## 2022-02-22 DIAGNOSIS — Z79.4 INSULIN DEPENDENT TYPE 2 DIABETES MELLITUS: ICD-10-CM

## 2022-02-22 DIAGNOSIS — E11.9 INSULIN DEPENDENT TYPE 2 DIABETES MELLITUS: ICD-10-CM

## 2022-02-22 DIAGNOSIS — F41.8 ANXIETY WITH DEPRESSION: Primary | ICD-10-CM

## 2022-02-22 DIAGNOSIS — M25.511 CHRONIC PAIN OF BOTH SHOULDERS: ICD-10-CM

## 2022-02-22 DIAGNOSIS — E55.9 VITAMIN D DEFICIENCY: ICD-10-CM

## 2022-02-22 DIAGNOSIS — Z11.59 ENCOUNTER FOR HEPATITIS C SCREENING TEST FOR LOW RISK PATIENT: ICD-10-CM

## 2022-02-22 DIAGNOSIS — F51.01 PRIMARY INSOMNIA: ICD-10-CM

## 2022-02-22 PROBLEM — Z87.891 FORMER SMOKER: Status: ACTIVE | Noted: 2022-02-22

## 2022-02-22 LAB
EXPIRATION DATE: NORMAL
HBA1C MFR BLD: 9.7 %
Lab: NORMAL

## 2022-02-22 PROCEDURE — 99214 OFFICE O/P EST MOD 30 MIN: CPT | Performed by: NURSE PRACTITIONER

## 2022-02-22 PROCEDURE — 83036 HEMOGLOBIN GLYCOSYLATED A1C: CPT | Performed by: NURSE PRACTITIONER

## 2022-02-22 PROCEDURE — 3046F HEMOGLOBIN A1C LEVEL >9.0%: CPT | Performed by: NURSE PRACTITIONER

## 2022-02-22 RX ORDER — PROCHLORPERAZINE 25 MG/1
SUPPOSITORY RECTAL
COMMUNITY
Start: 2021-11-18

## 2022-02-22 RX ORDER — METOPROLOL SUCCINATE 25 MG/1
25 TABLET, EXTENDED RELEASE ORAL DAILY
COMMUNITY
Start: 2022-02-07

## 2022-02-22 RX ORDER — PROCHLORPERAZINE 25 MG/1
SUPPOSITORY RECTAL
COMMUNITY
Start: 2022-01-24

## 2022-02-22 NOTE — PROGRESS NOTES
Shanique Luke  1965  [id number]  Patient Care Team:  Tonya Taylor APRN as PCP - General (Internal Medicine)  Henrry Simms MD (Rheumatology)  Harshad Stephens (Endocrinology)  Lester Garcia MD as Consulting Physician (Ophthalmology)  Dell Ritter MD (Gastroenterology)    Shanique Luke is a pleasant 56 y.o. female who presents for evaluation of Diabetes and Hypertension    Chief Complaint   Patient presents with   • Diabetes   • Hypertension       HPI:   Riana is here for routine follow up.  This is our third visit.  Her primary care provider at [facility] for many years was Dr. Kellie Tierney. An insurance change has necessitated a provider change.  She has a significant medical history including debilitating inclusion body myositis (formal dx 2014), hypertension, diabetes (insulin pump), melanoma, multinodular thyroid, primary insomnia, depression and anxiety.    DM:  Not seen Dr Stephens yet, saw him last year for thyroid.  Plans to have him follow thyroid and DM.  Diet changes last 3 weeks with reduction of sugar, carbs and gluten and noticing improvement in bs.  More fish and brown rice. Was runnning high for 2 weeks on her monitor and had some equipment malfunction.  dexicom showing avg 95 since yesterday. Making sushi and eating more Lao foods.  Eye exams up-to-date Dr. Javier    Myositis: More falls recently, worse as she has gained weight.  Cant get up from the floor if she falls at a location away from home. Chronic bilat frozen shoulders, left worse than right, pain worse after most recent fall and she could no longer work.  Uses hydrocodone BID, uses Advil 200mg every am and prn during the night.  Has not heard back on referral to pain management yet.Inclusion body myositis formal dx 2014 at [facility] after 10 years of leg pain, loss of muscle in extremities and frequent falls.  Does PT at home to maintain muscle.     HLD: tolearting rosuvastin 5 mg since Nov, due  to recheck labs but not fasting today.    Had holter with cards in Jan, had PVCs, started metoprolol and is tolerating well.  bp has also improved. Had echo and saw Dr Doyle Dial for follow up. No dyspnea or leg swelling noted.    ROBERT/PTSD:   On Topamax, alprazolam as needed, and escitalopram.  Takes Ambien for insomnia.  Recently saw new provider who does not write long-term benzos.  She has been on alprazolam for 11 years and it has worked well for her.  Her anxiety is mild on her current regimen.  I offered to try and find another psych provider for her.Would rather suffer with panic attacks than see psych again. She has failed BuSpar and hydroxyzine in the past.  She is on a beta-blocker now which we discussed might help with some anxiety symptoms.  Would be willing to write an immediate release beta-blocker for her to use as needed but she declines at this time.  Past Medical History:   Diagnosis Date   • Anxiety    • Arthritis    • Depression    • Diabetes mellitus (HCC)    • Inclusion body myositis    • Insomnia    • Melanoma (HCC) 1995   • Multinodular thyroid    • Small bowel obstruction (HCC)      Past Surgical History:   Procedure Laterality Date   • CATARACT EXTRACTION Right 2019   • CERVICAL FUSION  2018   • GASTROPLASTY  2001   • HYSTERECTOMY  2013   • LAMINECTOMY  2016   • MYOMECTOMY  1994   • PELVIC LAPAROSCOPY  1995     Family History   Problem Relation Age of Onset   • Depression Mother    • Diabetes Mother    • Depression Father    • Hypertension Father    • Rheum arthritis Maternal Grandmother    • Osteoporosis Maternal Grandmother    • Stroke Maternal Grandmother    • Arthritis Maternal Grandmother         RA   • Depression Maternal Grandmother    • Stroke Maternal Grandfather    • Depression Maternal Grandfather    • Pancreatic cancer Paternal Grandfather    • Cancer Paternal Grandfather         pancreatic   • Depression Paternal Grandfather    • Depression Paternal Grandmother      Social  History     Tobacco Use   Smoking Status Former Smoker   • Packs/day: 1.50   • Years: 10.00   • Pack years: 15.00   • Types: Cigarettes   • Quit date: 10/13/2009   • Years since quittin.3   Smokeless Tobacco Never Used     Allergies   Allergen Reactions   • Morphine Shortness Of Breath     Respiratory distress     • Doxycycline Hyclate Rash   • Penicillins Rash       Current Outpatient Medications:   •  ALPRAZolam (XANAX) 0.5 MG tablet, Take 1 tablet by mouth 2 (Two) Times a Day As Needed for Anxiety., Disp: 60 tablet, Rfl: 2  •  amLODIPine (NORVASC) 5 MG tablet, TAKE 1 TABLET BY MOUTH EVERY DAY, Disp: 90 tablet, Rfl: 1  •  Ascorbic Acid (VITAMIN C PO), Take 1 tablet by mouth Daily., Disp: , Rfl:   •  Cholecalciferol (VITAMIN D3 PO), Take 1 capsule by mouth Daily., Disp: , Rfl:   •  Continuous Blood Gluc Sensor (Dexcom G6 Sensor), USE 1 SENSOR EVERY 10 DAYS TO MONITOR BLOOD GLUCOSE, Disp: , Rfl:   •  Continuous Blood Gluc Transmit (Dexcom G6 Transmitter) misc, , Disp: , Rfl:   •  escitalopram (LEXAPRO) 10 MG tablet, Take 2 tablets by mouth Daily., Disp: 180 tablet, Rfl: 1  •  furosemide (LASIX) 40 MG tablet, Take 40 mg by mouth Daily., Disp: , Rfl:   •  HYDROcodone-acetaminophen (NORCO) 7.5-325 MG per tablet, Take 1 tablet by mouth Every 8 (Eight) Hours As Needed for Moderate Pain ., Disp: 90 tablet, Rfl: 0  •  insulin lispro (HumaLOG) 100 UNIT/ML patient supplied pump, Inject 100 Units under the skin into the appropriate area as directed Continuous. Uses 3 vials per month, Disp: , Rfl:   •  ketoconazole (NIZORAL) 2 % shampoo, Apply  topically to the appropriate area as directed 2 (Two) Times a Week., Disp: 120 mL, Rfl: 2  •  lisinopril (PRINIVIL,ZESTRIL) 40 MG tablet, Take 40 mg by mouth Daily., Disp: , Rfl:   •  loratadine (CLARITIN) 10 MG tablet, Take 10 mg by mouth Daily., Disp: , Rfl:   •  metoprolol succinate XL (TOPROL-XL) 25 MG 24 hr tablet, Take 25 mg by mouth Daily., Disp: , Rfl:   •  Multiple  "Vitamins-Minerals (ZINC PO), Take 1 tablet by mouth Daily., Disp: , Rfl:   •  NovoLOG 100 UNIT/ML injection, ADMINISTER 300 UNITS (3 ML) EVERY 3 DAYS IN INSULIN PUMP AS DIRECTED, Disp: , Rfl:   •  omeprazole (priLOSEC) 40 MG capsule, Take 40 mg by mouth Daily As Needed., Disp: , Rfl:   •  rosuvastatin (Crestor) 5 MG tablet, Take 1 tablet by mouth Daily., Disp: 90 tablet, Rfl: 1  •  tiZANidine (ZANAFLEX) 4 MG tablet, Take 4 mg by mouth Every 8 (Eight) Hours As Needed for Muscle Spasms., Disp: , Rfl:   •  topiramate (TOPAMAX) 50 MG tablet, Take 50 mg by mouth 2 (Two) Times a Day., Disp: , Rfl:   •  vitamin B-12 (CYANOCOBALAMIN) 1000 MCG tablet, Take 1 tablet by mouth Daily., Disp: 90 tablet, Rfl: 3  •  zolpidem (AMBIEN) 10 MG tablet, Take 1 tablet by mouth At Night As Needed for Sleep., Disp: 30 tablet, Rfl: 2    Review of Systems  /72 (BP Location: Left arm, Patient Position: Sitting, Cuff Size: Adult)   Pulse 64   Temp 96.4 °F (35.8 °C) (Temporal)   Ht 167 cm (65.75\")   Wt 104 kg (230 lb)   BMI 37.41 kg/m²     Physical Exam    Procedures    PHQ-9 Total Score:      Assessment/Plan:  Diagnoses and all orders for this visit:    1. Anxiety with depression (Primary)  Comments:  Declined referral to another psych provider.  We discussed that I will not prescribe long-term benzos.  I will refill her Ambien today.    2. Insulin dependent type 2 diabetes mellitus (HCC)  Comments:  Must make follow-up appointment with Dr. Stephens.  A1c today 9.7.  She has an insulin pump.  Orders:  -     POC Glycosylated Hemoglobin (Hb A1C)  -     Comprehensive Metabolic Panel; Future    3. Chronic pain of both shoulders  Comments:  Waiting on referral to pain management, will refill hydrocodone today    4. Inclusion body myositis (IBM)    5. Morbid (severe) obesity due to excess calories (HCC)    6. Primary insomnia  Comments:  Refill Ambien today  Orders:  -     zolpidem (AMBIEN) 10 MG tablet; Take 1 tablet by mouth At Night As " Needed for Sleep.  Dispense: 30 tablet; Refill: 2    7. Inclusion body myositis  Comments:  Continue PT exercises at home and physical activity as tolerated,continue using walker for fall prevention, waiting on referral to pain management  Orders:  -     HYDROcodone-acetaminophen (NORCO) 7.5-325 MG per tablet; Take 1 tablet by mouth Every 8 (Eight) Hours As Needed for Moderate Pain .  Dispense: 90 tablet; Refill: 0    8. Mixed hyperlipidemia  Comments:  Continue rosuvastatin, return for fasting labs  Orders:  -     Lipid Panel; Future  -     Microalbumin / Creatinine Urine Ratio - Urine, Clean Catch; Future    9. Vitamin D deficiency  -     Vitamin D 25 Hydroxy; Future    10. Encounter for hepatitis C screening test for low risk patient  -     Hepatitis C Antibody; Future    11. Encounter for screening mammogram for malignant neoplasm of breast  Comments:  Overdue for mammogram  Orders:  -     Mammo Screening Digital Tomosynthesis Bilateral With CAD; Future    Other orders  -     escitalopram (LEXAPRO) 10 MG tablet; Take 2 tablets by mouth Daily.  Dispense: 180 tablet; Refill: 1       Patient Instructions   Call to make an appointment with Dr. Stephens for your diabetes  Declines behavioral health referral at this time.  Encourage slow taper of alprazolam.    We will follow up on referral to pain management.  I will refill hydrocodone today.    This patient is on a controlled substance which improves symptoms/quality of life and is aware of the risks, benefits and possible side-effects current treatment. The patient denies any medication side-effects at this time. A controlled substance agreement will be obtained or is currently on file. We reviewed required monitoring for controlled substances including but not limited to quarterly follow-up visits, annual depression screening, and urine drug screens to which the patient is agreeable. A CELESTINE report has been or shortly will be reviewed. There are no signs of  deviation or misuse.         Plan of care reviewed with patient at the conclusion of today's visit. Education was provided regarding diagnosis, management and any prescribed or recommended OTC medications.  Patient verbalizes understanding of and agreement with management plan.    Return in about 3 months (around 5/22/2022) for Labs this visit.    Dictated Utilizing Dragon Dictation.    TREVOR Prince

## 2022-02-23 DIAGNOSIS — G72.41 INCLUSION BODY MYOSITIS: ICD-10-CM

## 2022-02-23 RX ORDER — HYDROCODONE BITARTRATE AND ACETAMINOPHEN 7.5; 325 MG/1; MG/1
1 TABLET ORAL EVERY 8 HOURS PRN
Qty: 90 TABLET | Refills: 0 | Status: CANCELLED | OUTPATIENT
Start: 2022-02-23

## 2022-02-23 RX ORDER — ESCITALOPRAM OXALATE 10 MG/1
20 TABLET ORAL DAILY
Qty: 180 TABLET | Refills: 1 | Status: SHIPPED | OUTPATIENT
Start: 2022-02-23

## 2022-02-23 RX ORDER — HYDROCODONE BITARTRATE AND ACETAMINOPHEN 7.5; 325 MG/1; MG/1
1 TABLET ORAL EVERY 8 HOURS PRN
Qty: 90 TABLET | Refills: 0 | Status: SHIPPED | OUTPATIENT
Start: 2022-02-23 | End: 2022-04-04 | Stop reason: SDUPTHER

## 2022-02-23 RX ORDER — ZOLPIDEM TARTRATE 10 MG/1
10 TABLET ORAL NIGHTLY PRN
Qty: 30 TABLET | Refills: 2 | Status: SHIPPED | OUTPATIENT
Start: 2022-02-23

## 2022-02-23 NOTE — PATIENT INSTRUCTIONS
Call to make an appointment with Dr. Stephens for your diabetes  Declines behavioral health referral at this time.  Encourage slow taper of alprazolam.    We will follow up on referral to pain management.  I will refill hydrocodone today.    This patient is on a controlled substance which improves symptoms/quality of life and is aware of the risks, benefits and possible side-effects current treatment. The patient denies any medication side-effects at this time. A controlled substance agreement will be obtained or is currently on file. We reviewed required monitoring for controlled substances including but not limited to quarterly follow-up visits, annual depression screening, and urine drug screens to which the patient is agreeable. A CELESTINE report has been or shortly will be reviewed. There are no signs of deviation or misuse.

## 2022-02-23 NOTE — TELEPHONE ENCOUNTER
Caller: [employer]    Relationship:     Best call back number: [phone]    Requested Prescriptions:   Requested Prescriptions     Pending Prescriptions Disp Refills   • HYDROcodone-acetaminophen (NORCO) 7.5-325 MG per tablet 90 tablet 0     Sig: Take 1 tablet by mouth Every 8 (Eight) Hours As Needed for Moderate Pain .        Pharmacy where request should be sent: [facility] [id number] [place] [facility] [place], [place] - [address] [phone] [facility] [phone]      Additional details provided by patient: JOSIE STATES THIS MEDICATIONS LAST PRIOR AUTHORIZATION IN December 2021 WAS ONLY FOR THE MONTH. JOSIE STATES AN AUTHORIZATION CAN BE SUBMITTED FOR UP TO A YEAR AND WANTING TO KNOW IF PCP WILL SUBMIT THIS.     Does the patient have less than a 3 day supply:  [] Yes  [] No    Clara Covington Rep   02/23/22 14:47 EST

## 2022-04-04 DIAGNOSIS — G72.41 INCLUSION BODY MYOSITIS: ICD-10-CM

## 2022-04-04 RX ORDER — HYDROCODONE BITARTRATE AND ACETAMINOPHEN 7.5; 325 MG/1; MG/1
1 TABLET ORAL EVERY 8 HOURS PRN
Qty: 90 TABLET | Refills: 0 | Status: SHIPPED | OUTPATIENT
Start: 2022-04-04

## 2022-04-04 NOTE — TELEPHONE ENCOUNTER
Rx Refill Note  Requested Prescriptions     Pending Prescriptions Disp Refills   • HYDROcodone-acetaminophen (NORCO) 7.5-325 MG per tablet 90 tablet 0     Sig: Take 1 tablet by mouth Every 8 (Eight) Hours As Needed for Moderate Pain .      Last office visit with prescribing clinician: 2/22/2022      Next office visit with prescribing clinician: 5/23/2022     LA: 02/23/22 #90 0R             Kellie Damon LPN  04/04/22, 16:11 EDT

## 2022-05-03 DIAGNOSIS — Z12.31 ENCOUNTER FOR SCREENING MAMMOGRAM FOR MALIGNANT NEOPLASM OF BREAST: ICD-10-CM

## 2022-05-31 RX ORDER — ROSUVASTATIN CALCIUM 5 MG/1
TABLET, COATED ORAL
Qty: 90 TABLET | Refills: 1 | Status: SHIPPED | OUTPATIENT
Start: 2022-05-31

## 2022-12-23 RX ORDER — LANOLIN ALCOHOL/MO/W.PET/CERES
CREAM (GRAM) TOPICAL
Qty: 30 TABLET | Refills: 11 | Status: SHIPPED | OUTPATIENT
Start: 2022-12-23

## 2024-08-25 PROBLEM — M25.579 PAIN AND SWELLING OF ANKLE: Status: ACTIVE | Noted: 2024-08-25

## 2024-08-25 PROBLEM — M25.473 PAIN AND SWELLING OF ANKLE: Status: ACTIVE | Noted: 2024-08-25

## 2024-08-26 ENCOUNTER — PATIENT ROUNDING (BHMG ONLY) (OUTPATIENT)
Dept: URGENT CARE | Facility: CLINIC | Age: 59
End: 2024-08-26
Payer: MEDICARE